# Patient Record
Sex: MALE | Race: WHITE | NOT HISPANIC OR LATINO | URBAN - METROPOLITAN AREA
[De-identification: names, ages, dates, MRNs, and addresses within clinical notes are randomized per-mention and may not be internally consistent; named-entity substitution may affect disease eponyms.]

---

## 2017-06-01 ENCOUNTER — SURGERY - HEALTHEAST (OUTPATIENT)
Dept: CARDIOLOGY | Facility: CLINIC | Age: 47
End: 2017-06-01

## 2017-06-01 ASSESSMENT — MIFFLIN-ST. JEOR
SCORE: 1741.25
SCORE: 1741.25

## 2017-06-02 ENCOUNTER — SURGERY - HEALTHEAST (OUTPATIENT)
Dept: CARDIOLOGY | Facility: CLINIC | Age: 47
End: 2017-06-02

## 2017-06-02 ASSESSMENT — MIFFLIN-ST. JEOR
SCORE: 1756.65
SCORE: 1756.65

## 2017-06-03 ASSESSMENT — MIFFLIN-ST. JEOR
SCORE: 1743.04
SCORE: 1743.04

## 2017-06-06 ENCOUNTER — AMBULATORY - HEALTHEAST (OUTPATIENT)
Dept: CARDIOLOGY | Facility: CLINIC | Age: 47
End: 2017-06-06

## 2017-06-06 DIAGNOSIS — E78.00 HYPERCHOLESTEROLEMIA: ICD-10-CM

## 2017-06-07 ENCOUNTER — COMMUNICATION - HEALTHEAST (OUTPATIENT)
Dept: ADMINISTRATIVE | Facility: CLINIC | Age: 47
End: 2017-06-07

## 2021-05-31 VITALS
HEIGHT: 70 IN | WEIGHT: 194.4 LBS | BODY MASS INDEX: 27.83 KG/M2 | WEIGHT: 194.4 LBS | HEIGHT: 70 IN | BODY MASS INDEX: 27.83 KG/M2

## 2021-06-11 NOTE — PROGRESS NOTES
Per Dr. Chandra, physician discharge line 5:58am 6/5/17, patient needs f/u appt w/ Dr. Griffin in about 1-3 months regarding CAD and fasting lipid profile in 1 month.   Order placed per protocol. In Basket message sent to , Aubree, requesting she contact patient and schedule both appointments.

## 2021-06-16 PROBLEM — I24.0: Status: ACTIVE | Noted: 2017-06-01

## 2021-06-16 PROBLEM — E78.00 HYPERCHOLESTEROLEMIA: Status: ACTIVE | Noted: 2017-06-03

## 2021-06-16 PROBLEM — I21.09 ACUTE ANTERIOR MYOCARDIAL INFARCTION (H): Status: ACTIVE | Noted: 2017-06-01

## 2021-06-16 PROBLEM — I25.110 CORONARY ARTERY DISEASE INVOLVING NATIVE CORONARY ARTERY OF NATIVE HEART WITH UNSTABLE ANGINA PECTORIS (H): Status: ACTIVE | Noted: 2017-06-01

## 2021-06-16 PROBLEM — Z82.49 FAMILY HISTORY OF MYOCARDIAL INFARCTION: Status: ACTIVE | Noted: 2017-06-01
